# Patient Record
Sex: FEMALE | Race: WHITE
[De-identification: names, ages, dates, MRNs, and addresses within clinical notes are randomized per-mention and may not be internally consistent; named-entity substitution may affect disease eponyms.]

---

## 2023-05-01 LAB
ALBUMIN SERPL BCP-MCNC: 3.3 G/DL (ref 3.4–5)
ALBUMIN/GLOB SERPL: 0.9 {RATIO} (ref 1.1–1.5)
ALP SERPL-CCNC: 47 IU/L (ref 46–116)
ALT SERPL W P-5'-P-CCNC: 14 U/L (ref 30–65)
ANION GAP SERPL CALCULATED.3IONS-SCNC: 10 MMOL/L (ref 8–16)
AST SERPL W P-5'-P-CCNC: 14 U/L (ref 10–37)
BASOPHILS # BLD AUTO: 0 X10'3 (ref 0–0.2)
BASOPHILS NFR BLD AUTO: 0.5 % (ref 0–1)
BILIRUB SERPL-MCNC: 0.3 MG/DL (ref 0–1)
BUN SERPL-MCNC: 9 MG/DL (ref 7–18)
BUN/CREAT SERPL: 11.4 (ref 10–20)
CALCIUM SERPL-MCNC: 8.2 MG/DL (ref 8.5–10.1)
CHLORIDE SERPL-SCNC: 106 MMOL/L (ref 99–107)
CO2 SERPL-SCNC: 23.6 MMOL/L (ref 24–32)
CREAT SERPL-MCNC: 0.79 MG/DL (ref 0.4–0.9)
EOSINOPHIL # BLD AUTO: 0.1 X10'3 (ref 0–0.9)
EOSINOPHIL NFR BLD AUTO: 1.3 % (ref 0–6)
ERYTHROCYTE [DISTWIDTH] IN BLOOD BY AUTOMATED COUNT: 14 % (ref 11.5–14.5)
GFR SERPL CREATININE-BSD FRML MDRD: 83 ML/MIN
GLUCOSE SERPL-MCNC: 95 MG/DL (ref 70–104)
HCG SERPL QL: NEGATIVE
HCT VFR BLD AUTO: 38.3 % (ref 35–45)
HGB BLD-MCNC: 13.1 G/DL (ref 12–16)
LYMPHOCYTES # BLD AUTO: 1.2 X10'3 (ref 1.1–4.8)
LYMPHOCYTES NFR BLD AUTO: 24.2 % (ref 21–51)
MCH RBC QN AUTO: 31.5 PG (ref 27–31)
MCHC RBC AUTO-ENTMCNC: 34.2 G/DL (ref 33–36.5)
MCV RBC AUTO: 92 FL (ref 78–98)
MONOCYTES # BLD AUTO: 0.4 X10'3 (ref 0–0.9)
MONOCYTES NFR BLD AUTO: 7.9 % (ref 2–12)
NEUTROPHILS # BLD AUTO: 3.3 X10'3 (ref 1.8–7.7)
NEUTROPHILS NFR BLD AUTO: 66.1 % (ref 42–75)
PLATELET # BLD AUTO: 245 X10'3 (ref 140–440)
PMV BLD AUTO: 7.8 FL (ref 7.4–10.4)
POTASSIUM SERPL-SCNC: 3.4 MMOL/L (ref 3.4–5.1)
PROT SERPL-MCNC: 7.1 G/DL (ref 6.4–8.2)
RBC # BLD AUTO: 4.16 X10'6 (ref 4.2–5.6)
SODIUM SERPL-SCNC: 140 MMOL/L (ref 135–145)

## 2023-05-05 ENCOUNTER — HOSPITAL ENCOUNTER (OUTPATIENT)
Dept: HOSPITAL 94 - PAS | Age: 33
Discharge: HOME | End: 2023-05-05
Attending: OBSTETRICS & GYNECOLOGY
Payer: COMMERCIAL

## 2023-05-05 VITALS — SYSTOLIC BLOOD PRESSURE: 97 MMHG | DIASTOLIC BLOOD PRESSURE: 51 MMHG

## 2023-05-05 VITALS — DIASTOLIC BLOOD PRESSURE: 70 MMHG | SYSTOLIC BLOOD PRESSURE: 112 MMHG

## 2023-05-05 VITALS — WEIGHT: 159.84 LBS | BODY MASS INDEX: 23.67 KG/M2 | HEIGHT: 69 IN

## 2023-05-05 VITALS — SYSTOLIC BLOOD PRESSURE: 109 MMHG | DIASTOLIC BLOOD PRESSURE: 73 MMHG

## 2023-05-05 VITALS — DIASTOLIC BLOOD PRESSURE: 57 MMHG | SYSTOLIC BLOOD PRESSURE: 96 MMHG

## 2023-05-05 VITALS — DIASTOLIC BLOOD PRESSURE: 71 MMHG | SYSTOLIC BLOOD PRESSURE: 116 MMHG

## 2023-05-05 DIAGNOSIS — Z30.2: Primary | ICD-10-CM

## 2023-05-05 DIAGNOSIS — N92.6: ICD-10-CM

## 2023-05-05 DIAGNOSIS — N94.6: ICD-10-CM

## 2023-05-05 DIAGNOSIS — N81.4: ICD-10-CM

## 2023-05-05 DIAGNOSIS — Z87.442: ICD-10-CM

## 2023-05-05 DIAGNOSIS — Z98.890: ICD-10-CM

## 2023-05-05 PROCEDURE — 82948 REAGENT STRIP/BLOOD GLUCOSE: CPT

## 2023-05-05 PROCEDURE — 84703 CHORIONIC GONADOTROPIN ASSAY: CPT

## 2023-05-05 PROCEDURE — 36415 COLL VENOUS BLD VENIPUNCTURE: CPT

## 2023-05-05 PROCEDURE — 80053 COMPREHEN METABOLIC PANEL: CPT

## 2023-05-05 PROCEDURE — 58563 HYSTEROSCOPY ABLATION: CPT

## 2023-05-05 PROCEDURE — 85025 COMPLETE CBC W/AUTO DIFF WBC: CPT

## 2023-05-05 PROCEDURE — 58670 LAPAROSCOPY TUBAL CAUTERY: CPT

## 2023-05-05 RX ADMIN — MEPERIDINE HYDROCHLORIDE PRN MG: 25 INJECTION, SOLUTION INTRAMUSCULAR; INTRAVENOUS; SUBCUTANEOUS at 11:38

## 2023-05-05 RX ADMIN — MEPERIDINE HYDROCHLORIDE PRN MG: 25 INJECTION, SOLUTION INTRAMUSCULAR; INTRAVENOUS; SUBCUTANEOUS at 11:45

## 2023-05-05 NOTE — NUR
Received from OR via [facility], accompanied by Anesthesiologist DR. MICHAEL and report given by 
Anesthesiolgist. SB, VSS. SEVERE PAIN. TORADOL GIVEN. 10L MASK O2 SATURATIONS 100%.

## 2023-05-05 NOTE — NUR
PATIENT MEETS DISCHARGE CRITERIA. DISCHARGED WITH ALL BELONGINGS INTO THE CARE OF HER 
, JOSE MIGUEL. DISCHARGE INSTRUCTIONS UNDERSTOOD.